# Patient Record
Sex: FEMALE | Race: WHITE
[De-identification: names, ages, dates, MRNs, and addresses within clinical notes are randomized per-mention and may not be internally consistent; named-entity substitution may affect disease eponyms.]

---

## 2017-03-21 ENCOUNTER — HOSPITAL ENCOUNTER (OUTPATIENT)
Dept: HOSPITAL 45 - C.MAMM | Age: 52
Discharge: HOME | End: 2017-03-21
Attending: OBSTETRICS & GYNECOLOGY
Payer: COMMERCIAL

## 2017-03-21 DIAGNOSIS — R92.2: Primary | ICD-10-CM

## 2017-03-21 DIAGNOSIS — N63: ICD-10-CM

## 2017-03-21 DIAGNOSIS — N60.02: ICD-10-CM

## 2017-03-21 DIAGNOSIS — N60.01: ICD-10-CM

## 2017-03-22 NOTE — MAMMOGRAPHY REPORT
ULTRASOUND OF BOTH BREASTS: 3/21/2017

CLINICAL HISTORY: 51 year-old woman with no strong family history of breast cancer presents for [facility] whole breast screening ultrasound for dense breasts, and also to follow-up a probably benign c
ircumscribed oval hypoechoic solid-appearing mass in the right breast.  





COMPARISON: Comparison is made to exams dated:  9/20/2016 mammogram, 7/21/2015 mammogram, 7/15/2014 
mammogram, 7/12/2013 ultrasound, 7/27/2012 mammogram, and 6/24/2011 mammogram - [facility].   



TECHNIQUE: Real-time high-resolution grayscale ultrasound including color flow Doppler was performed
 throughout each breast, including the retroareolar breast, and both axillae.



FINDINGS:  The right breast parenchymal echotexture is heterogeneousdense.  In the 11:30 to 12:00 r
ight breast, 1 cm from the nipple, there is a rounded hypoechoic solid-appearing mass measuring 8.5 
x 7.1 x 10.3 mm.  In the 12:00 right breast, 1 cm from the nipple, there is an oval parallel circums
cribed hypoechoic solid-appearing mass measuring 6.6 x 4.3 x 7.7 mm.  There is an adjacent hypoechoi
c solid versus cystic masses, oval, parallel and circumscribed.  This measures 7.6 x 6.3 mm.  A med
gn anechoic simple cyst is identified in the right 11:00 breast, 4 cm from the nipple, measuring 10.
5 x 10.6 x 11.0 mm.  2 abutting anechoic cysts are seen in the superficial 11:00 right breast, 2 cm 
from the nipple, measuring 27.6 x 8.3 mm in conglomerate.  There is a rounded nearly anechoic cystic
 appearing mass in the 10:00 periareolar right breast measuring 4.4 x 4.8 x 5.4 mm.  A hypoechoic po
ssible solid mass versus cystic mass with internal debris is identified in the 10:00 right breast, 3
 cm from the nipple, measuring 6.0 x 4.6 x 6.7 mm.  There is a multilobulated cyst with multiple int
ernal nonvascular septations in the 9:00 right breast, 8 cm from the nipple, measuring 15.7 x 6.8 x 
13.4 mm.  In the 9:00 right breast, 4 cm from the nipple, there is a nearly anechoic cystic appearin
g mass with posterior acoustic enhancement, measuring 14.3 mm in maximum dimension.  An isoechoic so
lid-appearing mass in the 9:00 right breast, to 7 m from the nipple measures 5.7 x 4.1 mm.  This is 
not as well appreciated in the radial plane and may simply represent stromal fibrosis.  There is a r
ounded hypoechoic to nearly anechoic cystic appearing mass in the 8:00 right breast, 8 cm from the n
ipple, measuring 5.7 x 4.6 x 8.8 mm.  A few other smaller scattered cysts are seen throughout the ri
ght breast and real-time scanning.  No other discrete solid or suspicious mass is identified.  No nobles
spicious right axillary lymphadenopathy.



The left breast parenchymal echotexture is heterogeneousdense.  In the 12:00 left breast, 1 cm from
 the nipple, there is a rounded hypoechoic solid versus cystic masses measuring 4.9 x 4.9 x 4.4 mm. 
 A probable cyst with internal nonvascular septation and thickened rim in the 12:00 periareolar left
 breast measures 7.4 x 5.2 x 4.7 mm.  There is a bilobed solid-appearing mass in the 1:30 left breas
t, 7 cm from the nipple, measuring 4.7 x 2.4 x 4.7 mm.  A dominant anechoic simple cyst is again see
n in the 1:30 left breast, 12 cm from the nipple, measuring 20.2 x 14.6 mm.  There is a lobulated hy
poechoic cystic-appearing mass in the 4:00 left breast, 6 cm from the nipple, measuring 8.2 x 6.7 x 
6.7 mm.  A rounded hypoechoic solid versus cystic mass in the 7:00 left breast, 5 cm from the nipple
, measures 4.7 x 4.1 x 5.6 mm.  Another cystic-appearing mass in the 9:00 left breast, 4 cm from the
 nipple, measures 5.1 x 3.1 x 3.9 mm.  There is an isoechoic, slightly hypoechoic solid versus cysti
c mass in the 10:00 left breast, 3 cm from the nipple, measuring 9.1 x 6.0 x 9.2 mm.  Other smaller 
anechoic simple cysts were identified throughout the left breast and real-time scanning.  No other s
uspicious solid mass is seen.  No suspicious left axillary lymphadenopathy.





IMPRESSION: ACR BI-RADS CATEGORY 4: SUSPICIOUS - FOLLOW-UP RECOMMENDED

There are multiple cysts scattered bilaterally in the breasts, compatible with fibrocystic changes. 
 There are benign appearing circumscribed solid masses in the breasts as well which most likely repr
esent benign fibroadenomas.  However, biopsy of the dominant mass in each breast is recommended (the
 9 mm mass in the 10:00 left breast, 3 cm from the nipple, and the 10 mm mass in the 11:30 right codi
ast, 1 cm from the nipple), and pending benign pathology results the other solid appearing masses ca
n be followed in short intervals to ensure stability.



These results and recommendations were discussed with the patient at the time of the exam.  She tent
atively scheduled the biopsies prior to leaving our department.

Donna Weinstein M.D.  

ay/:3/21/2017 17:51:03  



Imaging Technologist: Dr. Donna Weinstein, [facility]

letter sent: Abnormal 4/5  

BI-RADS Code: ACR BI-RADS Category 4: Suspicious

## 2017-03-28 ENCOUNTER — HOSPITAL ENCOUNTER (OUTPATIENT)
Dept: HOSPITAL 45 - C.MAMM | Age: 52
Discharge: HOME | End: 2017-03-28
Attending: OBSTETRICS & GYNECOLOGY
Payer: COMMERCIAL

## 2017-03-28 DIAGNOSIS — N60.82: Primary | ICD-10-CM

## 2017-03-28 DIAGNOSIS — N60.31: ICD-10-CM

## 2017-03-28 NOTE — DISCHARGE INSTRUCTIONS
Discharge Instructions


Procedure


Procedure Date:


Mar 28, 2017.


Reason for visit:


Bilateral Masses.





Discharge


Discharge Date:


Mar 28, 2017.


Discharge Diagnosis:


post left breast ultrasound guided cyst aspiration and right breast ultrasound 

guided core biopsy





Instructions


Activity Recommendations:  Additional Limitations (see below)


Return to School/Work:  no limitations


Recommended Home Diet:  No Limitations


Provider Instructions:





ACTIVITY RECOMMENDATIONS:





*  No lifting, pushing, pulling or exercising the affected side for three days.








RETURN TO SCHOOL/WORK:





*  You may return to work/school after the procedure, but do not perform any 

strenuous


   activities for 24 to 48 hours.








MEDICATIONS:





*  Tylenol (two 325 mg) every four to six hours if needed for mild pain (if not 

allergic to Tylenol).








DIET:





*  Resume previous diet.








SPECIAL CARE INSTRUCTIONS:





*  Keep biopsy site dry for 24 hours.  May shower after 24 hours, but do not 

soak (bathe)


   incision.





*  May remove Tegaderm (plastic patch) tomorrow AFTER showering.





*  Leave the steri-strips on for one week.  Allow the steri-strips to fall off 

by themselves.  


   If not off after one week, you may remove them.  You may place a Bandaid


   crosswise over the strips, if desired.





*  Apply ice 10 minutes on and 10 minutes off as needed.





*  Wear a bra at bedtime to sleep more comfortably for 2-3 days.





*  Your referring physician should have the results after approximately 5 to 7 

business days.





*  Call for unusual bleeding, fever, drainage, etc or if you have any questions 

call


    [phone] during normal business hours or after hours call Dr Weinstein, 

[phone].








FOLLOW UP VISIT:





Follow-up with Referring Physician as scheduled.


Cynthia Oneil Recommendations:


 


Call your doctor if:


*  Temperature above 101 degrees


*  Pain not relieved by pain medicine ordered


*  There is increased drainage or redness from any incision


*  You have any unanswered questions or concerns.





Your Doctors Instructions noted above were prepared by provider Donna Weinstein.


Patient Signature Section:


 Patient Instructions Signature Page








Leslye Gee 











Patient (or Guardian) Signature/Date:____________________________________ I 

have read and understand the instructions given to me by my caregivers.








Caregiver/RN/Doctor Signature/Date:____________________________________








The above-named patient and/or guardian has received patient instructions on 

this date.


























+  Original Patient Signature Page (only) stays with chart.  Please make copy 

for patient.

## 2017-03-28 NOTE — MAMMOGRAPHY REPORT
ULTRASOUND GUIDED BIOPSY RIGHT BREAST: 3/28/2017

CLINICAL HISTORY: Multiple masses scattered in each breast, some of which are cystic and others appe
ar solid.  Patient presents for ultrasound-guided core biopsy for the dominant solid mass in each br
east.  



COMPARISON: Comparison is made to exams dated:  3/21/2017 ultrasound, 9/20/2016 ultrasound, 9/20/201
6 mammogram, 7/25/2016 mammogram, 7/21/2015 mammogram, and 7/15/2014 mammogram - [facility].



PATIENT CONSENT: The procedure, risks and benefits were discussed with the patient and informed writ
ten consent was obtained. Specific risks to this procedure include: bleeding, infection, puncture of
 adjacent structure, nontarget biopsy, sampling error, metal allergy and medication reaction.



PROCEDURE DESCRIPTION: First real-time high-resolution ultrasound was performed in the 12:00 right b
reast and 10:00 left breast to reevaluate the hypoechoic solid-appearing mass is identified on prior
 ultrasound performed 03/21/2017.  There are again seen and appears similar to the prior exam.  Curr
ently the mass in the 12:00 right breast appears more solid in nature while the mass in the 10:00 le
ft breast may be cystic in nature given that there is faint posterior acoustic enhancement appreciat
ed.  This area is difficult to evaluate given the depth in the breast surrounded by dense breast tis
la and location abutting the pectoralis muscle.  I explained to the patient that ultrasound guided 
cyst aspiration could be attempted prior to core biopsy to see if this mass in the 10:00 left breast
 indeed represents a cyst.



A time out was performed and both breasts were agreed as the sites for biopsy.  First the mass in th
e 12:00 right breast, 1 cm from the nipple was identified and targeted for biopsy.  The skin of the 
right breast was cleansed with Betadine.  1% buffered lidocaine with and without epinephrine was adm
inistered as local anesthesia.  A small incision was made in the skin.  Through the incision, 4 core
 biopsy samples were obtained with a 14-gauge achieve spring-loaded biopsy device.  A ribbon-shaped 
metallic biopsy marker was placed within the metal residual mass.  The patient tolerated the procedu
re well and there was no immediate consultation.  Hemostasis was achieved after a few minutes of man
ual compression.  The samples were sent to the pathology department expedited, in an appropriately l
abeled container.



And then the isoechoic solid versus cystic mass in the 10:00 left breast was identified and targeted
 for biopsy.  The skin of the left breast was cleansed with Betadine.  Sterile drapes were placed in
 the area.  1% buffered lidocaine with and without epinephrine was administered as local anesthesia.
  A 22-gauge needle was then advanced to the site of the mass and within the mass and aspiration was
 performed.  This mass resolved completely.  After documenting the resolution with an additional ult
rasound image, the fluid was sent for cytologic analysis.  The patient tolerated the procedure in th
e left breast well and there was no significant bleeding after the procedure.



Postprocedure right CC and ML 2-D digital and tomosynthesis images were obtained.  There is a new ri
bbon-shaped metallic biopsy marker in the 12:00 right breast, at the site of the biopsied mass seen 
on prior ultrasound.



Pending benign pathology results and follow-up bilateral diagnostic mammograms and repeat targeted u
ltrasound is recommended for other similar appearing smaller masses bilaterally.



IMPRESSION: ULTRASOUND GUIDED BIOPSY

Status post ultrasound guided core needle biopsy of an indeterminate solid mass in the 12:00 right b
reast, and ultrasound-guided cyst aspiration of a cystic mass in the 10:00 left breast. 



Pending benign pathology results, follow-up bilateral diagnostic mammograms and repeat targeted ultr
asound is recommended to ensure stability of other smaller similar appearing masses within each kody
st.  







Donna Weinstein M.D.  

ay/:3/28/2017 14:31:59  



Imaging Technologist: Nadine KELLY)MARYA), [facility]

## 2017-03-28 NOTE — MAMMOGRAPHY REPORT
UNILATERAL RIGHT DIGITAL DIAGNOSTIC MAMMOGRAM TOMOSYNTHESIS: 3/28/2017

CLINICAL HISTORY: Status post ultrasound-guided core biopsy of an indeterminate solid mass in the 12
:00 right breast.  Also performed at the same time wasn't ultrasound guided cyst aspiration in the 1
0:00 left breast.  



Please refer to the report from right breast ultrasound guided core biopsy [facility] for full detail.

IMPRESSION:  POST PROCEDURE IMAGING FOR MARKER PLACEMENT

Please refer to the report from right breast ultrasound guided core biopsy performed [facility] for full detail.



Approximately 10% of breast cancers are not detected with mammography. A negative mammographic repor
t should not delay biopsy if a clinically suggestive mass is present.



Donna Weinstein M.D.          

ay/:3/28/2017 14:33:36  



Imaging Technologist: Nadine KELLY)(WESLEY), [facility]



BI-RADS Code: Post Procedure Imaging For Marker Placement

## 2017-09-14 ENCOUNTER — HOSPITAL ENCOUNTER (OUTPATIENT)
Dept: HOSPITAL 45 - C.PAPS | Age: 52
Discharge: HOME | End: 2017-09-14
Attending: OBSTETRICS & GYNECOLOGY
Payer: COMMERCIAL

## 2017-09-14 DIAGNOSIS — Z01.419: Primary | ICD-10-CM

## 2017-09-28 ENCOUNTER — HOSPITAL ENCOUNTER (OUTPATIENT)
Dept: HOSPITAL 45 - C.MAMM | Age: 52
Discharge: HOME | End: 2017-09-28
Attending: OBSTETRICS & GYNECOLOGY
Payer: COMMERCIAL

## 2017-09-28 DIAGNOSIS — N63: Primary | ICD-10-CM

## 2017-09-29 NOTE — MAMMOGRAPHY REPORT
BILATERAL DIGITAL DIAGNOSTIC MAMMOGRAM TOMOSYNTHESIS WITH CAD AND TARGETED BILATERAL ULTRASOUND: 9/28
/2017

CLINICAL HISTORY: Short interval follow-up of bilateral breast masses.  History of benign ultrasound-
guided core needle biopsy of a right 12:00 breast mass March 2017.  Also with ultrasound guided aspir
ation of a left breast mass.  





TECHNIQUE:  Breast tomosynthesis in addition to standard 2D mammography was performed. Current study 
was also evaluated with a Computer Aided Detection (CAD) system.  Bilateral CC and MLO 2-D and tomosy
nthesis images were obtained.  



COMPARISON: Comparison is made to exams dated:  3/28/2017 mammogram, 3/28/2017 ultrasound biopsy, 3/2
1/2017 ultrasound, 9/20/2016 ultrasound, 9/20/2016 mammogram, and 7/25/2016 mammogram - [facility].   



BREAST COMPOSITION:  The tissue of both breasts is extremely dense, which lowers the sensitivity of m
ammography.  



FINDINGS:  There are no suspicious masses, calcifications, or areas of architectural distortion noted
 in either breast mammographically.  There has been no significant interval change compared to prior 
exams.  A biopsy marker clip is noted in the right upper outer quadrant from prior benign ultrasound 
guided biopsy.  Scattered bilateral benign-appearing calcifications are not significantly changed.  M
ultiple obscured masses are again noted bilaterally, which correspond with the masses seen on ultraso
und.  



Targeted ultrasound was performed of the area of the previously seen masses for which follow-up was r
ecommended.  The right breast at 12:00, 1 cm from the nipple, again noted is a round circumscribed hy
poechoic 5 x 6 mm mass, not significantly changed.  Another hypoechoic circumscribed 5 mm mass in the
 right 12:00 breast, 1 cm from the nipple, is also unchanged.  In the right breast at 8:00, 8 cm from
 the nipple, again noted is a bilobed circumscribed hypoechoic mass which measures 1.8 x 0.4 x 0.6 cm
, unchanged compared to the March 2017 exam when accounting for differences in measurement technique.
  These hypoechoic masses likely represent complicated cysts.  In the right breast at 9:00, 2 cm from
 the nipple, there is an isoechoic 6 x 3 x 6 mm mass which is stable and is probably benign and likel
y represents normal breast tissue.  In the right breast at 9:00, 4 cm from the nipple, there is a rou
nd circumscribed partially anechoic and partially hypoechoic 1.4 cm mass which is stable and likely r
epresents a complicated cyst.  In the right breast at 10:00, 3 cm from the nipple, there is a round c
ircumscribed 5 x 5 mm mass which is partially anechoic and partially hypoechoic, which is not signifi
cantly changed and likely represents a complicated cyst.  In the right breast at 10:00 periareolar re
gion, there is a nearly anechoic circumscribed round 5 x 5 x 5 mm mass, which is unchanged and is pro
bably benign and likely represents a cyst.  



In the left breast at 12:00, 1 cm from the nipple, there is an oval nearly anechoic circumscribed 10 
x 8 mm mass which likely represents a cyst.  Also in the left breast at 12:00, 1 cm from the nipple, 
is a round circumscribed hypoechoic 4 x 5 x 5 mm mass which is unchanged and likely represents a cyst
.  In the left 12:00 periareolar breast, there is a anechoic circumscribed mass with a thin internal 
septation measuring 6 x 5 mm, not significantly changed and likely represents a small cyst cluster.  
In the left breast at 1:30, 7 cm from the nipple, there is an oval circumscribed hypoechoic 5 x 4 mm 
mass, also unchanged and likely represents a cyst. In the left breast at 4:00, 6 cm from the nipple, 
there is a round circumscribed hypoechoic 5 x 4 mm mass, which appears decreased compared to the Jose Alberto
h 2017 exam, previously measuring 8 x 7 x 7 mm, and is considered benign given the interval decrease 
in size and likely represents a cyst.  In the left breast at 7:00, 5 cm from the nipple, there is a r
ound hypoechoic circumscribed 4 x 4 mm mass, not significantly changed and likely represents a cyst. 
 In the left breast at 9:00, 4 cm from the nipple, there is an oval circumscribed hypoechoic 5 x 5 mm
 mass, not significantly changed and likely represents a cyst.  In the left breast at 10:00, 3 cm in 
the nipple, there is an oval circumscribed hypoechoic 9 x 5 mm mass, not significantly changed.



Given the multiplicity and bilaterality of the hypoechoic masses and given the benign morphology incl
uding circumscribed margins, the masses are probably benign and likely represent cysts, versus benign
 solid masses such as fibroadenomas.  Recommend another short interval follow-up ultrasound in 6 rossi
hs to confirm longer stability.



IMPRESSION:  ACR-BI-RADS CATEGORY 3: PROBABLY BENIGN, TARGETED ULTRASOUND ACR-BI-RADS CATEGORY 3: PRO
BABLY BENIGN 

1.  No mammographic evidence of malignancy in either breast.  Recommend routine bilateral screening m
ammograms in one year.

2.  Multiple bilateral circumscribed hypoechoic masses are not significantly changed compared to the 
March 2017 ultrasound exam, and are probably benign and likely represent complicated cysts.  Recommen
d follow-up targeted bilateral ultrasound in 6 months to confirm longer stability of the masses (30 m
inute time slot).



The patient has been verbally notified of the results.  





Approximately 10% of breast cancers are not detected with mammography. A negative mammographic report
 should not delay biopsy if a clinically suggestive mass is present.



Alondra Galvez M.D.          

ah/:9/29/2017 07:26:15  



Imaging Technologist: Mariam Lee, [facility]

letter sent: Follow Up Recommended 3  

BI-RADS Code: ACR-BI-RADS Category 3: Probably Benign  Ultrasound BI-RADS: ACR-BI-RADS Category 3: Pr
obably Benign

## 2018-03-28 ENCOUNTER — HOSPITAL ENCOUNTER (OUTPATIENT)
Dept: HOSPITAL 45 - C.MAMM | Age: 53
Discharge: HOME | End: 2018-03-28
Attending: OBSTETRICS & GYNECOLOGY
Payer: COMMERCIAL

## 2018-03-28 DIAGNOSIS — N63.10: ICD-10-CM

## 2018-03-28 DIAGNOSIS — N60.01: Primary | ICD-10-CM

## 2018-03-28 DIAGNOSIS — N60.02: ICD-10-CM

## 2018-03-28 DIAGNOSIS — N63.20: ICD-10-CM

## 2018-03-30 NOTE — MAMMOGRAPHY REPORT
ULTRASOUND OF BOTH BREASTS: 3/28/2018

CLINICAL HISTORY: 52-year-old woman with a history of prior benign right breast ultrasound-guided cor
e biopsy and left breast cyst aspiration as well as extremely dense breasts presents for follow-up ul
trasound of probably benign cystic versus benign-appearing solid masses in both breasts on ultrasound
.  Seen.  





COMPARISON: Comparison is made to exams dated:  9/28/2017 mammogram, 9/28/2017 ultrasound, 3/28/2017 
mammogram, 3/28/2017 ultrasound biopsy, 3/21/2017 ultrasound, and 9/20/2016 ultrasound - [facility].   



FINDINGS: Real-time high resolution sonographic evaluation was performed in each breast, with particu
lar attention to the benign-appearing hypoechoic possible cystic versus solid masses bilaterally.  In
 the 12:00 left breast, 1 cm from the nipple, there is an oval parallel circumscribed hypoechoic javad
d versus cystic mass abutting the pectoralis muscle, measuring 12 x 5 x 11 mm.  This has not signific
antly changed comparing to the prior ultrasound given slight differences in measuring technique, [facility] 8 x 10 mm.  Also in the 12:00 left breast, 1 cm from the nipple, there is a rou
nd hypoechoic solid versus cystic mass with faint posterior acoustic enhancement appreciated, measuri
ng 4 x 5 x 5 mm, previously 4 x 5 x 5 mm.  In the 12:00 periareolar left breast a cystic-appearing ma
ss is no longer seen.  In the 130 left breast, 7 cm from the nipple, there is a gently lobulated para
llel hypoechoic solid versus cystic mass measuring 6 x 3 x 5 mm, previously 5 x 4 mm, unchanged.  In 
the 7:00 left breast, 5 cm from the nipple, there is a small circumscribed hypoechoic versus anechoic
 cystic-appearing mass measuring 4 x 3 x 4 mm, previously 4 x 4 mm, unchanged.  In the 9:00 left kody
st, 4 cm from the nipple, there is an oval parallel hypoechoic cystic-appearing mass measuring 5 x 2 
x 6 mm, previously 5 x 5 mm.  In the 10:00 left breast, 3 cm from the nipple, there is a lobulated hy
poechoic solid versus cystic mass measuring 4 x 3 x 5 mm, previously measured 5 x 9 mm.  Additional s
sam in the 10:00 left breast does not demonstrate reaccumulation of the previously aspirated cyst
.



In the right 12:00 breast, 1 cm from the nipple, there are 2 hypoechoic solid versus cystic masses, m
easuring 7 x 5, and 5 x 5 mm.  These have not significantly changed comparing to the ultrasound at 
ich time they measured 5 x 6 and 5 x 5 mm.  In the 8:00 right breast, 8 cm from the nipple, there is 
a multilobulated mass versus 2 abutting masses measuring 9 x 9 mm, previously measured 13 x 6 mm in c
onglomerate, unchanged given differences in measuring technique.  In the 8:00 right breast, 8 cm from
 the nipple, there is an oval parallel circumscribed hypoechoic mass with faint posterior acoustic en
hancement abutting the pectoralis muscle, measuring 6 mm.  In the 9:00 right breast, 2 cm from the ni
pple, there is a lobulated isoechoic solid-appearing mass measuring 6 x 4 x 8 mm, previously measured
 6 x 3 x 6 mm, unchanged visually with slight differences in measuring technique.  A previously obser
chanelle mass in the 10:00 right breast, 3 cm from the nipple is currently labeled 9:00 breast, 4 cm from 
the nipple and has decreased in size, currently measuring 4 mm.  Another cystic-appearing mass in the
 10:00 periareolar right breast is also unchanged measuring 5 x 5 mm.



IMPRESSION: ACR-BI-RADS CATEGORY 3: PROBABLY BENIGN - FOLLOW-UP RECOMMENDED

1. There are multiple bilateral cysts, probable complicated cysts and a few benign-appearing solid ma
sses in both breasts which are stable, decreased or resolved on ultrasound, most likely benign with o
ne year of stability.  Another 12 month follow-up bilateral ultrasound is recommended to ensure at le
ast 2 years of stability to confirm benignity.  Would consider complete/whole breast ultrasound at th
at time to ensure inclusion of all of the possible masses (45 minutes).

2.  Would also recommend continuation of annual screening mammography schedule, due SeptemberOctober 2018.



These results and recommendation were discussed with the patient at the time of the exam.



Donna Weinstein M.D.  

ay/:3/29/2018 11:49:45  



Imaging Technologist: Mariam RIVERS(OZIEL)([place]), [facility]

letter sent: Follow Up Recommended 3  

BI-RADS Code: ACR-BI-RADS Category 3: Probably Benign